# Patient Record
Sex: FEMALE | Race: WHITE | NOT HISPANIC OR LATINO | URBAN - METROPOLITAN AREA
[De-identification: names, ages, dates, MRNs, and addresses within clinical notes are randomized per-mention and may not be internally consistent; named-entity substitution may affect disease eponyms.]

---

## 2024-10-23 ENCOUNTER — OFFICE VISIT (OUTPATIENT)
Dept: URGENT CARE | Facility: CLINIC | Age: 74
End: 2024-10-23
Payer: COMMERCIAL

## 2024-10-23 ENCOUNTER — APPOINTMENT (OUTPATIENT)
Dept: RADIOLOGY | Facility: CLINIC | Age: 74
End: 2024-10-23
Payer: COMMERCIAL

## 2024-10-23 VITALS
OXYGEN SATURATION: 100 % | WEIGHT: 166 LBS | SYSTOLIC BLOOD PRESSURE: 144 MMHG | DIASTOLIC BLOOD PRESSURE: 70 MMHG | TEMPERATURE: 97 F | RESPIRATION RATE: 18 BRPM | HEART RATE: 68 BPM

## 2024-10-23 DIAGNOSIS — M25.552 LEFT HIP PAIN: ICD-10-CM

## 2024-10-23 DIAGNOSIS — M79.605 LEFT LEG PAIN: ICD-10-CM

## 2024-10-23 DIAGNOSIS — S76.912A MUSCLE STRAIN OF LEFT THIGH, INITIAL ENCOUNTER: Primary | ICD-10-CM

## 2024-10-23 PROCEDURE — 73590 X-RAY EXAM OF LOWER LEG: CPT

## 2024-10-23 PROCEDURE — G0382 LEV 3 HOSP TYPE B ED VISIT: HCPCS | Performed by: PHYSICIAN ASSISTANT

## 2024-10-23 PROCEDURE — 73502 X-RAY EXAM HIP UNI 2-3 VIEWS: CPT

## 2024-10-23 RX ORDER — KRILL/OM-3/DHA/EPA/PHOSPHO/AST 500-110 MG
500 CAPSULE ORAL
COMMUNITY

## 2024-10-23 NOTE — PROGRESS NOTES
[facility]        NAME: Alejandrina Gallegos is a 74 y.o. female  : 1950    MRN: [id number]  DATE: 2024  TIME: 11:32 AM    Assessment and Plan   Muscle strain of left thigh, initial encounter [S76.912A]  1. Muscle strain of left thigh, initial encounter        2. Left leg pain  XR tibia fibula 2 vw left      3. Left hip pain  XR hip/pelv 2-3 vws left if performed            Patient Instructions     Patient Instructions   Xray provider read- no acute findings identified.  Pending radiologist final read.    Advised that it is likely a muscular strain at this point.  Recommended over-the-counter pain medication as needed.  Advised to use heat for stiffness and ice for pain.    Follow up with PCP in 3-5 days.  Proceed to  ER if symptoms worsen.    If tests are performed, our office will contact you with results only if changes need to made to the care plan discussed with you at the visit. [facility]      Chief Complaint     Chief Complaint   Patient presents with    Leg Pain     Pt here for left leg on it and cannot put weight on it starts at top of thigh and goes down went to walk dog holding leash dog moves a lot          History of Present Illness       Patient presents today for evaluation of left leg pain.  She denies any obvious injury, fall, trauma to the leg.  Her pain started on Monday and she thinks it started after walking the dog.  She states that she does walk her dog quite often and is unsure if that is what caused the pain.  She has a husky who is very active and energetic and pulls her lung a lot.  Her pain starts at the top of the thigh of her left leg and then goes down her leg.  It hurts to walk and move and she has a hard time putting weight on it.  She has tried pain medication, ice, and heat with no relief        Review of Systems   Review of Systems   Musculoskeletal:  Positive for arthralgias, gait problem and myalgias.   All other  systems reviewed and are negative.        Current Medications       Current Outpatient Medications:     Krill Oil (Omega-3) 500 MG CAPS, Take 500 mg by mouth, Disp: , Rfl:     Current Allergies     Allergies as of 10/23/2024    (No Known Allergies)            The following portions of the patient's history were reviewed and updated as appropriate: allergies, current medications, past family history, past medical history, past social history, past surgical history and problem list.     History reviewed. No pertinent past medical history.    History reviewed. No pertinent surgical history.    History reviewed. No pertinent family history.      Medications have been verified.        Objective   /70   Pulse 68   Temp (!) 97 °F (36.1 °C) (Tympanic)   Resp 18   Wt 75.3 kg (166 lb)   SpO2 100%        Physical Exam     Physical Exam  Vitals and nursing note reviewed.   Constitutional:       Appearance: Normal appearance.   Musculoskeletal:      Left hip: Tenderness and bony tenderness present. Normal range of motion. Normal strength.      Left upper leg: Tenderness and bony tenderness present. No swelling.      Left lower leg: Bony tenderness present. No swelling or tenderness.      Comments: Nonspecific tenderness throughout the left hip and the musculature of the upper thigh, however most of the pain is to the lateral aspect.  Some tenderness over the tibia.    Patient able to bear weight and walk.   Skin:     General: Skin is warm and dry.   Neurological:      General: No focal deficit present.      Mental Status: She is alert and oriented to person, place, and time.   Psychiatric:         Mood and Affect: Mood normal.         Behavior: Behavior normal.

## 2024-10-23 NOTE — PATIENT INSTRUCTIONS
Xray provider read- no acute findings identified.  Pending radiologist final read.    Advised that it is likely a muscular strain at this point.  Recommended over-the-counter pain medication as needed.  Advised to use heat for stiffness and ice for pain.    Follow up with PCP in 3-5 days.  Proceed to  ER if symptoms worsen.    If tests are performed, our office will contact you with results only if changes need to made to the care plan discussed with you at the visit. You can review your full results on [facility].

## 2024-10-30 ENCOUNTER — HOSPITAL ENCOUNTER (OUTPATIENT)
Dept: NON INVASIVE DIAGNOSTICS | Facility: CLINIC | Age: 74
Discharge: HOME | End: 2024-10-30
Payer: COMMERCIAL

## 2024-10-30 DIAGNOSIS — R60.9 EDEMA, UNSPECIFIED: ICD-10-CM

## 2024-10-30 DIAGNOSIS — M79.605 PAIN IN LEFT LEG: ICD-10-CM

## 2024-10-30 PROCEDURE — 93971 EXTREMITY STUDY: CPT

## 2024-10-30 PROCEDURE — 93971 EXTREMITY STUDY: CPT | Performed by: SURGERY

## 2024-12-05 ENCOUNTER — OFFICE VISIT (OUTPATIENT)
Dept: CARDIOLOGY CLINIC | Facility: MEDICAL CENTER | Age: 74
End: 2024-12-05
Payer: COMMERCIAL

## 2024-12-05 VITALS
WEIGHT: 165 LBS | OXYGEN SATURATION: 98 % | BODY MASS INDEX: 32.39 KG/M2 | SYSTOLIC BLOOD PRESSURE: 136 MMHG | HEIGHT: 60 IN | DIASTOLIC BLOOD PRESSURE: 80 MMHG | HEART RATE: 70 BPM

## 2024-12-05 DIAGNOSIS — I10 PRIMARY HYPERTENSION: Primary | ICD-10-CM

## 2024-12-05 DIAGNOSIS — Z82.49 FAMILY HISTORY OF EARLY CAD: ICD-10-CM

## 2024-12-05 PROCEDURE — 99203 OFFICE O/P NEW LOW 30 MIN: CPT | Performed by: STUDENT IN AN ORGANIZED HEALTH CARE EDUCATION/TRAINING PROGRAM

## 2024-12-05 RX ORDER — LOSARTAN POTASSIUM 50 MG/1
50 TABLET ORAL DAILY
COMMUNITY

## 2024-12-05 NOTE — PROGRESS NOTES
[facility] [place] [address]  [address]  [facility] [address]  Phone#  [phone]  Fax#  [phone]  [facility] Cardiology Office Consultation             NAME: Alejandrina Gallegos  AGE: 74 y.o. SEX: female   : 1950   MRN: [id number]    DATE: 2024  TIME: 3:11 PM    Assessment & Plan  Primary hypertension  Pressure slightly elevated in clinic today at 136/80.  She reports that she just recently started losartan 50 mg daily.  We will continue this for now and reassess need for additional blood pressure medications.  Encouraged home blood pressure monitoring  Family history of early CAD  Family history significant for early coronary artery disease with brother with had an MI in his 30s.  Will do further assessment or risk factors including cholesterol panel with a LP(a) and apolipoprotein B. CT calcium score ordered.      Follow up 1 year       ------     Chief Complaint   Patient presents with    New Patient Visit     Referred to by PCP for Abnormal EKG       HPI:    Alejandrina Gallegos is a 74 y.o.-year-old female who presents to the cardiology clinic for abnormal ECG from family practice.    Past medical history significant for recently diagnosed hypertension.    She reports going to her family practice physician for routine follow-up.  Had an ECG obtained that was reportedly abnormal and she was remarkable for echo for further evaluation.  I reviewed the ECG (and scanned to media section).  It showed normal sinus rhythm with nonspecific ST changes in inferior and anterior leads.  No evidence of ischemia.  She denies any chest pain, chest discomfort, shortness of breath at rest, dyspnea on exertion.  Denies any other symptoms.    She does have significant family history of coronary artery disease.  Her brother passed away in his 30s from an MI.  He also had congestive heart failure.  Reports her mother also has history of some type of cardiac disease.     ------    I personally  reviewed the patient's pertinent cardiac imaging and labs in Epic:    10/29/24 Lipid panel showed total cholesterol 239, HDL 36, TG 36,     -----    Past history, family history, social history, current medications, vital signs, recent lab and imaging studies and  prior cardiology studies reviewed independently on this visit.    No Known Allergies    Current Outpatient Medications:     Krill Oil (Omega-3) 500 MG CAPS, Take 500 mg by mouth, Disp: , Rfl:     losartan (COZAAR) 50 mg tablet, Take 50 mg by mouth daily, Disp: , Rfl:     History reviewed. No pertinent past medical history.  History reviewed. No pertinent surgical history.  Family History   Problem Relation Age of Onset    Heart failure Mother     Heart disease Brother     Heart attack Brother        Social History   reports that she has never smoked. She does not have any smokeless tobacco history on file.     Review of Systems   Constitutional:  Negative for fatigue.   Respiratory:  Negative for chest tightness and shortness of breath.    Cardiovascular:  Negative for chest pain and palpitations.   Neurological: Negative.          Objective:     Vitals:    12/05/24 0845   BP: 136/80   Pulse: 70   SpO2: 98%     Physical Exam  Vitals reviewed.   Constitutional:       General: She is not in acute distress.     Appearance: She is well-developed.   HENT:      Head: Normocephalic and atraumatic.   Eyes:      Conjunctiva/sclera: Conjunctivae normal.   Cardiovascular:      Rate and Rhythm: Normal rate and regular rhythm.      Heart sounds: No murmur heard.  Pulmonary:      Effort: Pulmonary effort is normal. No respiratory distress.      Breath sounds: Normal breath sounds.   Musculoskeletal:         General: No swelling.      Cervical back: Neck supple.   Skin:     General: Skin is warm and dry.   Neurological:      Mental Status: She is alert.   Psychiatric:         Mood and Affect: Mood normal.           Pertinent Laboratory/Diagnostic  "Studies:    Laboratory studies reviewed personally by Susan Smith MD    BMP:   Lab Results   Component Value Date    SODIUM 141 12/21/2023    K 4.5 12/21/2023     12/21/2023    CO2 29 12/21/2023    BUN 22 (H) 12/21/2023    CREATININE 0.8 12/21/2023    GLUC 89 12/21/2023    CALCIUM 9.1 12/21/2023     CBC:No results found for: \"WBC\", \"HGB\", \"HCT\", \"MCV\", \"PLT\"  Lipid Profile:   No results found for: \"HDL\"  No results found for: \"LDLCALC\"  No results found for: \"TRIG\"   Other labs:  No results found for: \"KVV4VXNHOVDB\", \"TSH\"  Lab Results   Component Value Date    ALT 28 12/21/2023    AST 22 12/21/2023       Imaging Studies:     Pertinent cardiac studies and imaging studies were personally reviewed on this visit and results summarized above.    Susan Smith MD, PhD    Portions of the record may have been created with voice recognition software.  Occasional wrong word or \"sound alike\" substitutions may have occurred due to the inherent limitations of voice recognition software.  Read the chart carefully and recognize, using context, where substitutions have occurred. Please reach out to me directly for any clarifications.   "

## 2024-12-11 ENCOUNTER — HOSPITAL ENCOUNTER (OUTPATIENT)
Age: 74
Discharge: HOME | End: 2024-12-11
Payer: COMMERCIAL

## 2024-12-11 VITALS — BODY MASS INDEX: 32.22 KG/M2 | HEIGHT: 60 IN

## 2024-12-11 VITALS — HEIGHT: 59 IN | BODY MASS INDEX: 33.26 KG/M2 | WEIGHT: 165 LBS

## 2024-12-11 DIAGNOSIS — Z78.0 ASYMPTOMATIC MENOPAUSAL STATE: ICD-10-CM

## 2024-12-11 DIAGNOSIS — Z12.31 ENCOUNTER FOR SCREENING MAMMOGRAM FOR MALIGNANT NEOPLASM OF BREAST: ICD-10-CM

## 2024-12-11 PROCEDURE — 77080 DXA BONE DENSITY AXIAL: CPT

## 2024-12-11 PROCEDURE — 77067 SCR MAMMO BI INCL CAD: CPT

## 2024-12-11 PROCEDURE — 77063 BREAST TOMOSYNTHESIS BI: CPT

## 2025-01-16 ENCOUNTER — HOSPITAL ENCOUNTER (OUTPATIENT)
Dept: MAMMOGRAPHY | Facility: CLINIC | Age: 75
End: 2025-01-16
Payer: COMMERCIAL

## 2025-01-16 ENCOUNTER — HOSPITAL ENCOUNTER (OUTPATIENT)
Dept: ULTRASOUND IMAGING | Facility: CLINIC | Age: 75
End: 2025-01-16
Payer: COMMERCIAL

## 2025-01-16 DIAGNOSIS — R92.8 ABNORMAL MAMMOGRAM: ICD-10-CM

## 2025-01-16 PROCEDURE — 77066 DX MAMMO INCL CAD BI: CPT

## 2025-01-16 PROCEDURE — 76642 ULTRASOUND BREAST LIMITED: CPT

## 2025-01-16 RX ADMIN — IOHEXOL 100 ML: 350 INJECTION, SOLUTION INTRAVENOUS at 13:08

## 2025-01-18 ENCOUNTER — HOSPITAL ENCOUNTER (OUTPATIENT)
Dept: CT IMAGING | Facility: HOSPITAL | Age: 75
Discharge: HOME | End: 2025-01-18
Attending: STUDENT IN AN ORGANIZED HEALTH CARE EDUCATION/TRAINING PROGRAM
Payer: COMMERCIAL

## 2025-01-18 DIAGNOSIS — Z82.49 FAMILY HISTORY OF EARLY CAD: ICD-10-CM

## 2025-01-18 PROCEDURE — 75571 CT HRT W/O DYE W/CA TEST: CPT

## 2025-01-22 ENCOUNTER — RESULTS FOLLOW-UP (OUTPATIENT)
Dept: NON INVASIVE DIAGNOSTICS | Facility: HOSPITAL | Age: 75
End: 2025-01-22

## 2025-01-22 ENCOUNTER — TELEPHONE (OUTPATIENT)
Age: 75
End: 2025-01-22

## 2025-01-22 NOTE — TELEPHONE ENCOUNTER
Received a call from [facility] calling  on CTA testing     Advised it was already review by provider today at 918 am Verbally understood

## 2025-05-28 ENCOUNTER — TELEPHONE (OUTPATIENT)
Age: 75
End: 2025-05-28

## 2025-05-28 NOTE — TELEPHONE ENCOUNTER
Received call from Patient asking if her appt was confirmed automatically when she did it over the phone. Patient's 5/29/25 10:30 am Fritz Hernandez appt already confirmed. Provided Mary addr.     Patient verbalized understanding.

## 2025-05-29 ENCOUNTER — OFFICE VISIT (OUTPATIENT)
Age: 75
End: 2025-05-29
Payer: COMMERCIAL

## 2025-05-29 VITALS
SYSTOLIC BLOOD PRESSURE: 178 MMHG | HEIGHT: 59 IN | DIASTOLIC BLOOD PRESSURE: 97 MMHG | WEIGHT: 170.4 LBS | HEART RATE: 60 BPM | BODY MASS INDEX: 34.35 KG/M2 | TEMPERATURE: 97.6 F

## 2025-05-29 DIAGNOSIS — C44.311 BASAL CELL CARCINOMA (BCC) OF SKIN OF NOSE: ICD-10-CM

## 2025-05-29 DIAGNOSIS — L98.9 SKIN LESION: Primary | ICD-10-CM

## 2025-05-29 PROCEDURE — 99202 OFFICE O/P NEW SF 15 MIN: CPT

## 2025-05-29 PROCEDURE — 11640 EXC F/E/E/N/L MAL+MRG 0.5CM<: CPT

## 2025-05-29 PROCEDURE — 88305 TISSUE EXAM BY PATHOLOGIST: CPT | Performed by: PATHOLOGY

## 2025-05-29 NOTE — PROGRESS NOTES
"[facility]  [address], [address], [place] [address]  [phone]    Patient Identification: Alejandrina Gallegos is a 75 y.o. female     History of Present Illness: The patient is a 75 y.o.  year-old female  who presents to the office for a new patient consultation regarding a lesion on the nose. Patient states the lesion has been present for a few months and she is concerned for skin cancer. Lesion will scab and crust occasionally. Pt has a history of Mohs surgery to the right oral commissure due to skin cancer, type is unknown.   Patient has no other complaints at this time.    Past Medical History[1]       Review of Systems  Constitutional: Denies fevers, chills or pain .  Skin: Denies any warmth, erythema, edema or mucopurulent drainage.     Physical Exam  Vitals:    05/29/25 1030   BP: (!) 178/97   Pulse: 60   Temp:      Constitutional:AAOx3, well-developed, no distress. Pt is cooperative and pleasant.  Skin: 0.5 flat, erosion to the left nasal tip. See media    Shave Biopsy Left Nasal Tip    Date/Time: 5/29/2025 10:30 AM    Performed by: Claudia Bailon PA-C  Authorized by: Claudia Bailon PA-C    Universal Protocol:  procedure performed by consultantConsent: Verbal consent obtained  Risks and benefits: risks, benefits and alternatives were discussed  Consent given by: patient  Time out: Immediately prior to procedure a \"time out\" was called to verify the correct patient, procedure, equipment, support staff and site/side marked as required.  Patient identity confirmed: verbally with patient    Procedure Details - Lesion Biopsy:     Body area:  Head/neck    Head/neck location:  Nose    Biopsy method: shave biopsy      Biopsy tissue type: skin    Initial size (mm):  5    Final defect size (mm):  5     Area cleansed with alcohol swab. Infiltrated with 1cc 1% xylocaine. Specimen obtained with 15 blade. Hemostasis obtained. Dressed with bacitracin and band-aid.   "       Assessment and Plan:  The patient is an 75 y.o.  year-old female who presents to the office for a new patient consultation regarding a lesion to the nose.      -At today's visit chart reviewed, history obtained and exam conducted.  -Details of the procedure and after-care were reviewed with the patient. Lesion will be sent for pathology review, resulting in 7-14 days.   - Reviewed the risks of the procedure, such as bleeding, infection, asymmetry, contour deformity, scarring, wound healing problems, anesthesia risks, prolonged numbness of the area as well as possibility of subsequent procedures. All questions were answered at this time. Patient would like to move forward with biopsy.  -Shave biopsy was preformed,patient tolerated well. She is to apply bacitracin and a bandaid to the area daily. Wash with soap and water gently.   -The patient will be contacted with her results via [facility]  -The patient is to call the office with any questions or concerns. All of the patient's questions were answered at this time and they agree with the plan of care.    I have spent a total time of 25 minutes in caring for this patient on the day of the visit/encounter including Patient and family education, Impressions, Documenting in the medical record, Reviewing/placing orders in the medical record (including tests, medications, and/or procedures), and Obtaining or reviewing history  .     Claudia Bailon PA-C  [facility]           [1] No past medical history on file.

## 2025-06-03 PROCEDURE — 88305 TISSUE EXAM BY PATHOLOGIST: CPT | Performed by: PATHOLOGY

## 2025-06-06 NOTE — TELEPHONE ENCOUNTER
Patient called stating she has received her biopsy results in her mychart on 6/3/25 and has not heard anything about the next steps if we could please give her a call [phone]

## 2025-06-09 ENCOUNTER — TELEPHONE (OUTPATIENT)
Dept: PLASTIC SURGERY | Facility: CLINIC | Age: 75
End: 2025-06-09

## 2025-06-09 NOTE — TELEPHONE ENCOUNTER
Called and spoke to patient regarding biopsy results and next steps. All questions were answered. Will reach out to [facility] and schedule pre-op appointment with plastics. Surgical coordinator will be in touch with patient.    no

## 2025-06-16 ENCOUNTER — TELEPHONE (OUTPATIENT)
Dept: DERMATOLOGY | Facility: CLINIC | Age: 75
End: 2025-06-16

## 2025-06-16 DIAGNOSIS — L98.9 SKIN LESION: Primary | ICD-10-CM

## 2025-06-16 NOTE — TELEPHONE ENCOUNTER
Pre- operative Mohs Telephone Scheduling Note    Do you have a pacemaker or defibrillator? no    Do you have a cochlear implant, spinal or brain stimulator? no    Do you take antibiotics before skin or dental procedures? no  If yes, will likely require pre-operative antibiotics. Ask  the patient why they take the antibiotics (usually because of joint replacement).    Do you have a history of a joint replacements within the past 2 years? no   If yes, will likely require pre-operative antibiotics. Ask if orthopaedic surgeon has prescribed pre-operative antibiotics to take before procedures/dental work?    Do you take any OTC medications that thin your blood (Aspirin, Aleve, Ibuprofen) or supplements that thin your blood (fish oil, garlic, vitamin E, Ginko Biloba)? no    Do you take any prescribed medications that thin your blood (Coumadin, Plavix, Xarelto, Eliquis or another prescribed blood thinner)? no    Do you have an allergy to lidocaine or epinephrine? no    Do you have allergies to Iodine? no    Do you wear a lidocaine patch? no    Have you ever been diagnosed with HIV, AIDS, Hep B and Hep C? no    Do you use a cane, walker or wheelchair? no    Is the patient from a nursing home? no If yes, Is there any special accommodations that is needed for patient N/A    Do you smoke? no      If yes,  patient to try and stop 2 days before surgery and 7 days after the surgery. Minimizing smoking as much as possible during this time will improve healing and the cosmetic result after surgery.    Do you use supplemental oxygen? If so, how many liters and can you be off it for a short period of time? N/A    Date scheduled: tbd    Coordination of Care with other provider (Oculoplastics, Plastics, ENT) required? yes: coordianting   IF YES, PLEASE FORWARD TO APPROPRIATE PERSONNEL TO HELP COORDINATE.    Are there remaining tumors to be scheduled? no    Was Prior Authorization obtained? No (please use [email or web address] to  document prior auth)

## 2025-06-16 NOTE — LETTER
Alejandrina Gallegos     1950    [address]   [address]  [place] [address]    Dear Alejandrina Gallegos,    You are scheduled to have the Mohs procedure on November 6, 2025 at 8 am for nose with Dr. Alejandra Koo. Your surgery will be located in [facility] at the [facility] our address is [address] [place] [address]. Once you arrive please check in with our front staff in [address] and then wait Mohs waiting room [address].  If you have someone bringing you to your appointment they may wait in the waiting room or accompany you in your visit.     Please note this surgery is coordinated with another specialty. That office should have provided you with pre-operative instructions to follow for the same-day procedure.  Reminder: You will still need to report to the Mohs procedure first to have the skin cancer removed followed by the closure scheduled with another specialty.     Below you will find some information regarding the Mohs procedure.     If you have any questions please call our office at [phone].     Thank you,    [facility] Department         PRE-OPERATIVE INSTRUCTIONS - MOHS    Before your scheduled surgery, there are a number of important precautions and positive steps you should take to help prepare yourself for a successful treatment and speedy recovery.    Some of the steps, which are listed below, may seem unnecessary and inconvenient, but they are important. For example, when you stop smoking, you increase your ability to heal. Occasionally, there may be valid reasons, personal or medical, why you can't comply. In such cases, please call the office so we can discuss possible ways to overcome any obstacles you may be encountering.    If you have any questions about the surgery, or remember additional medical information that you forgot to mention to our staff, please contact the office prior to your surgery.    GENERAL INFORMATION REGARDING MOHS  MICROGRAPHIC SURGERY    Mohs surgery is a specialized technique for the removal of skin cancer developed by Dr. Frederick Mohs over 50 years ago to improve the cure rates of skin cancer. Traditionally, skin cancers are treated by destructive methods (radiation, freezing, scraping, and burning) or excision (cutting out the tissue with standards margins and sending it to an outside laboratory for testing). These methods all yield cure rates between 65%-94%. However, for cancers located in cosmetically sensitive areas, large tumors, or tumors unsuccessfully treated by other means, Mohs surgery offers a higher cure rate. In most cases, Mohs surgery provides you with a 99% cure rate for primary (previously untreated) basal cell cancer and a 95% cure rate for primary squamous cell cancer. In Mohs surgery, tissue is removed and processed in a way that we are able to check 100% of the margins, giving the highest cure rate for any method of treating skin cancers while providing maximal preservation of normal skin. This allows the surgeon to produce an optimal cosmetic result for the patient by maximizing the amount of tissue removed yielding as small a scar as possible    On the day of surgery, you will be given local anesthesia only (similar to what was given to you during your initial biopsy). You will remain awake. You will verify the location of the skin cancer prior to the onset of the surgery. Once the area is numb, the tissue containing the skin cancer will be removed, taking a small safety margin. This margin is usually smaller than what would be taken with a standard excision. Once the tissue is removed, it is marked and oriented. The first layer (“Stage I”) will be processed in our laboratory. The wound will be treated for bleeding and a bandage will be placed to keep you comfortable while you wait an approximate 45 minutes-1 hour (for the processing of the tissue) in your room. Your Mohs surgeon will examine the  pathology in the lab, checking all the margins. If any tumor remains, you will need to take a second layer of skin (“Stage 2”). The area will be re-anesthetized and your Mohs surgeon will remove more skin only in the area where the tumor exists. This process will continue until all the skin cancer is removed. Unfortunately, there is no method to predict how many layers or stages will be taken.    Once the tumor has been removed completely, we will discuss the best ways to close the defect. Most wounds may be closed with stitches. A larger wound may require a skin graft or a flap. In rare instances, especially for cancers around the eye or for larger cancers, we may work with another surgeon (oculoplastic, ENT, plastics) with special skills to assist with reconstruction.  If the surgery is coordinated with another specialist, you will have the Mohs portion of the procedure first and see the coordinated specialist after the skin cancer has been removed.  Always follow the pre-operative instructions of the surgeon doing the closure.      Lidocaine Patches: Avoid wearing any over the counter or prescribed Lidocaine patches the day of the surgery.    Alcohol: Avoid drinking alcohol for 2 days prior to your surgery, and for 2 days afterwards (it thins the blood and causes more bruising and swelling).    Smoking: Try to STOP or reduce smoking significantly the week before your surgery, and especially the week afterwards (it greatly improves how well you heal). Tobacco smoke deprives the blood of oxygen, which is urgently needed by the wound during the healing process.    Contact Lenses: Do not wear them on the day of the surgery. Instead, wear glasses and bring your case, in case we need to remove them.    Clothing: Do not wear your nicest clothing on your surgery day. We recommend wearing a button down shirt that will not disrupt your post-operative dressing when changing later that night. Please avoid wearing jeans  during the procedure to help prevent damage to our equipment.     Bathing: On the morning of your surgery, you may bathe or shower normally. If you get your hair done on a weekly basis, remember to get your hair washed the day before surgery.   - You will need to keep your surgical site dry for a minimum of 48 hours after surgery.    Makeup: If your surgery is on the face, please do not wear any makeup on the day of the surgery.    Jewelry: Please try to avoid wearing jewelry on the day of surgery.      What to bring with you on the day of your surgery:  Bring a sweater - Bring a sweater or jacket that buttons or zips down the front and will not disturb your wound dressing during removal.  Bring something to do - You will be spending much of the day in our office. There will be 45-60 minute waiting periods  between layers/stages, and while there is a television with cable in every room, it is nice to have something to keep you occupied such as books, magazines, knitting, music, or work.     Planning Ahead:  Appointment Length - Understand this procedure length is unpredictable, therefore, plan to be in the office for at least half a day. Depending on procedures scheduled prior to yours, there may be waiting prior to the start of your procedure.  Other Appointments - It is important to realize that no matter how small the skin cancer appears to be, looks can be deceiving. Since your surgery may last the entire day, you should not schedule any other appointments that day.  Special Occasions - Surgery often creates swelling and bruising. Also, the post-op dressing may be rather large and obvious. Keep this in mind as you arrange your social/work schedule. If an important event is already planned, please check with your referring physician or your Mohs surgeon to see if the surgery can be postponed.  Activity Limits after Surgery - If surgery was performed on your face, we recommend that you keep your activity level to  a minimum for 2-3 days (the blood pressure elevation related to exercise can lead to bleeding). If you have stitches in an area that will be under tension or significant movement (neck, back, arms, legs), you will need to avoid heavy lifting (anything over 5 lbs) or exercise for at least 2 weeks and possibly longer. We also advise that you limit out of town travel for the first 7 days after surgery. You should also wait at least 7 days before going into a pool or the ocean.  Housework - Since you will need to minimize activity after surgery, plan to do your groceries, laundry, gardening, and other heavy household chores prior to your surgery. Please make arrangements for assistance during the post-op period. If surgery is around your mouth area, you may need to eat soft foods, such as soup, milkshakes, or yogurt for 48 hours.    Purchasing bandage supplies: Prior to surgery, please purchase the following items to care for your surgical wound properly.  Cotton swabs (Q-tips)  Vaseline or Aquaphor  Telfa pads (or any non-stick dressing)  Paper tape or Hypafix tape  Gauze pads (3x3)    Rescheduling: If you need to reschedule your surgery, please notify the office as soon as possible.

## 2025-06-16 NOTE — TELEPHONE ENCOUNTER
Pt calling back as several days have passed and she has not heard anything    Pt was supposed to hear from MOHS team for scheduling, but I do not see a MOHS referral was placed by Yaneth.    Pt also needs plastics pre-op scheduled, and has not been contacted yet    Apologized to pt, advised I will reach out to both teams and make sure they follow up with her. Pt expressed frustration that it took her a while to get her biopsy results too. She is upset we have not contacted her in a timely manner.    Please call pt at [phone]    [facility])

## 2025-06-16 NOTE — TELEPHONE ENCOUNTER
Spoke with patient. Apologized for the delay. Advised patient I put in an ASAP ref for MOHS and to give them until the end of the week to get her scheduled. Patient verbalized understanding. Informed patient that pre-op visit with plastics will be determined by the coordinating surgery date with MOHS. Patient verbalized understanding once more. Thank you Laura!

## 2025-06-23 NOTE — TELEPHONE ENCOUNTER
Spoke to Alejandrina and coordinated mohs with plastics closure with Dr. Harris- 11/6/25 at 8 am with Dr Koo.